# Patient Record
Sex: FEMALE | Race: WHITE | NOT HISPANIC OR LATINO | URBAN - METROPOLITAN AREA
[De-identification: names, ages, dates, MRNs, and addresses within clinical notes are randomized per-mention and may not be internally consistent; named-entity substitution may affect disease eponyms.]

---

## 2021-05-14 ENCOUNTER — IMMUNIZATION (OUTPATIENT)
Dept: OBSTETRICS AND GYNECOLOGY | Facility: CLINIC | Age: 46
End: 2021-05-14
Payer: COMMERCIAL

## 2021-05-14 DIAGNOSIS — Z23 NEED FOR VACCINATION: Primary | ICD-10-CM

## 2021-05-14 PROCEDURE — 91300 COVID-19, MRNA, LNP-S, PF, 30 MCG/0.3 ML DOSE VACCINE: CPT | Mod: PBBFAC | Performed by: FAMILY MEDICINE

## 2021-06-02 ENCOUNTER — IMMUNIZATION (OUTPATIENT)
Dept: OBSTETRICS AND GYNECOLOGY | Facility: CLINIC | Age: 46
End: 2021-06-02
Payer: COMMERCIAL

## 2021-06-02 DIAGNOSIS — Z23 NEED FOR VACCINATION: Primary | ICD-10-CM

## 2021-06-02 PROCEDURE — 0002A COVID-19, MRNA, LNP-S, PF, 30 MCG/0.3 ML DOSE VACCINE: CPT | Mod: PBBFAC | Performed by: NURSE PRACTITIONER

## 2021-06-02 PROCEDURE — 91300 COVID-19, MRNA, LNP-S, PF, 30 MCG/0.3 ML DOSE VACCINE: CPT | Mod: PBBFAC | Performed by: NURSE PRACTITIONER

## 2023-09-08 ENCOUNTER — HOSPITAL ENCOUNTER (EMERGENCY)
Facility: OTHER | Age: 48
Discharge: HOME | End: 2023-09-08
Attending: EMERGENCY MEDICINE
Payer: COMMERCIAL

## 2023-09-08 VITALS
HEART RATE: 78 BPM | HEIGHT: 68 IN | BODY MASS INDEX: 25.76 KG/M2 | SYSTOLIC BLOOD PRESSURE: 143 MMHG | TEMPERATURE: 98 F | WEIGHT: 170 LBS | OXYGEN SATURATION: 98 % | DIASTOLIC BLOOD PRESSURE: 66 MMHG | RESPIRATION RATE: 18 BRPM
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
## 2023-09-08 DIAGNOSIS — M25.562 ACUTE PAIN OF BOTH KNEES: ICD-10-CM

## 2023-09-08 DIAGNOSIS — M25.562 LEFT KNEE PAIN: ICD-10-CM

## 2023-09-08 DIAGNOSIS — M25.561 ACUTE PAIN OF BOTH KNEES: ICD-10-CM

## 2023-09-08 DIAGNOSIS — M25.561 RIGHT KNEE PAIN: ICD-10-CM

## 2023-09-08 PROCEDURE — 63600175 PHARM REV CODE 636 W HCPCS

## 2023-09-08 PROCEDURE — 96372 THER/PROPH/DIAG INJ SC/IM: CPT

## 2023-09-08 PROCEDURE — 99284 EMERGENCY DEPT VISIT MOD MDM: CPT

## 2023-09-08 RX ORDER — KETOROLAC TROMETHAMINE 30 MG/ML
10 INJECTION, SOLUTION INTRAMUSCULAR; INTRAVENOUS
Status: COMPLETED | OUTPATIENT
Start: 2023-09-08 | End: 2023-09-08

## 2023-09-08 RX ORDER — ACETAMINOPHEN 325 MG/1
650 TABLET ORAL
Status: DISCONTINUED | OUTPATIENT
Start: 2023-09-08 | End: 2023-09-08 | Stop reason: HOSPADM

## 2023-09-08 RX ORDER — NAPROXEN 500 MG/1
500 TABLET ORAL 2 TIMES DAILY PRN
Qty: 10 TABLET | Refills: 0 | Status: SHIPPED | OUTPATIENT
Start: 2023-09-08 | End: 2023-09-13

## 2023-09-08 RX ADMIN — KETOROLAC TROMETHAMINE 10 MG: 30 INJECTION, SOLUTION INTRAMUSCULAR; INTRAVENOUS at 11:09

## 2023-09-08 NOTE — ED TRIAGE NOTES
Tripped and fell this morning - presents with c/o pain to both knees, L>R. States able to bear weight but with pain. No other complaints.

## 2023-09-08 NOTE — DISCHARGE INSTRUCTIONS
Please try naproxen as needed for your pain.  If you have a reaction to naproxen, stop use and please take Benadryl and Pepcid.  Take Tylenol as needed for pain.  Please follow-up with your orthopedic surgeon.

## 2023-09-08 NOTE — FIRST PROVIDER EVALUATION
Emergency Department TeleTriage Encounter Note      CHIEF COMPLAINT    Chief Complaint   Patient presents with    Knee Pain     Reports tripping over puppy this morning landing on BL knees. Reports pain to both knees, worsening in L knee with trouble bending. Hx of knee problems. Swelling noted to BL knees.        VITAL SIGNS   Initial Vitals [09/08/23 0946]   BP Pulse Resp Temp SpO2   (!) 126/58 81 18 98.1 °F (36.7 °C) 98 %      MAP       --            ALLERGIES    Review of patient's allergies indicates:   Allergen Reactions    Ibuprofen Hives    Augmentin [amoxicillin-pot clavulanate] Rash    Codeine Rash       PROVIDER TRIAGE NOTE  Patient presents with pain in bilateral knees secondary to fall this morning. Denies other injuries.       ORDERS  Labs Reviewed - No data to display    ED Orders (720h ago, onward)      None              Virtual Visit Note: The provider triage portion of this emergency department evaluation and documentation was performed via [employer], a HIPAA-compliant telemedicine application, in concert with a tele-presenter in the room. A face to face patient evaluation with one of my colleagues will occur once the patient is placed in an emergency department room.      DISCLAIMER: This note was prepared with [employer] voice recognition transcription software. Garbled syntax, mangled pronouns, and other bizarre constructions may be attributed to that software system.

## 2023-09-08 NOTE — ED PROVIDER NOTES
Encounter Date: 9/8/2023       History     Chief Complaint   Patient presents with    Knee Pain     Reports tripping over puppy this morning landing on BL knees. Reports pain to both knees, worsening in L knee with trouble bending. Hx of knee problems. Swelling noted to BL knees.      Yeison Cornell is a 48 y.o. female with history of chronic bilateral knee pain presenting to the emergency department for evaluation of worsening bilateral knee pain after tripping over her dog and falling onto both knees in the kitchen earlier this morning.  Patient states that the ayanna in her kitchen is tile.  Patient states that she did hit her right elbow on the cabinet and head on the refrigerator.  She denies loss of consciousness.  She has been ambulatory with a limp since the incident.  No treatments prior to arrival.  She is not on blood thinners.  Patient states that she has been receiving joint injections into both knees by her orthopedic surgeon.  She is followed by Dr. Worthington at [facility].  She denies paresthesias.      The history is provided by the patient.     Review of patient's allergies indicates:   Allergen Reactions    Ibuprofen Hives    Augmentin [amoxicillin-pot clavulanate] Rash    Codeine Rash     No past medical history on file.  No past surgical history on file.  No family history on file.     Review of Systems   Constitutional:  Negative for chills and fever.   HENT:  Negative for congestion, rhinorrhea and sore throat.    Respiratory:  Negative for cough and shortness of breath.    Cardiovascular:  Negative for chest pain.   Gastrointestinal:  Negative for abdominal pain, diarrhea, nausea and vomiting.   Genitourinary:  Negative for dysuria, frequency and urgency.   Musculoskeletal:  Negative for back pain.        Positive for bilateral knee pain.   Skin:  Negative for rash.   Neurological:  Negative for dizziness, numbness and headaches.   Psychiatric/Behavioral:  Negative for confusion.         Physical Exam     Initial Vitals [09/08/23 0946]   BP Pulse Resp Temp SpO2   (!) 126/58 81 18 98.1 °F (36.7 °C) 98 %      MAP       --         Physical Exam    Nursing note and vitals reviewed.  Constitutional: She appears well-developed and well-nourished. No distress.   HENT:   Head: Normocephalic and atraumatic.   Eyes: Conjunctivae and EOM are normal.   Neck: Neck supple.   Normal range of motion.  Cardiovascular:  Normal rate, regular rhythm, normal heart sounds and intact distal pulses.           Pulses:       Dorsalis pedis pulses are 2+ on the right side and 2+ on the left side.        Posterior tibial pulses are 2+ on the right side and 2+ on the left side.   Pulmonary/Chest: Breath sounds normal. No respiratory distress. She has no wheezes. She has no rhonchi. She has no rales. She exhibits no tenderness.   Musculoskeletal:         General: Normal range of motion.      Cervical back: Normal range of motion and neck supple.      Comments: Normal range of motion of bilateral knees.  Tenderness to palpation of anterolateral knees.  Crepitus present on the right.  No obvious deformity.  No overlying skin changes.     Neurological: She is alert and oriented to person, place, and time. She has normal strength. No cranial nerve deficit or sensory deficit.   Skin: Skin is warm and dry.   No abrasions or lacerations.   Psychiatric: She has a normal mood and affect. Her behavior is normal. Judgment and thought content normal.         ED Course   Procedures  Labs Reviewed - No data to display       Imaging Results              X-Ray Knee 1 or 2 View Right (Final result)  Result time 09/08/23 11:57:06   Procedure changed from X-Ray Knee 3 View Right     Final result by Kala Silva MD (09/08/23 11:57:06)                   Impression:      No fracture identified.  Minimal soft tissue edema at the prepatellar region.      Electronically signed by: Kala Silva  MD  Date:    09/08/2023  Time:    11:57               Narrative:    EXAMINATION:  XR KNEE 1 OR 2 VIEW RIGHT    CLINICAL HISTORY:  pain;  Pain in right knee    TECHNIQUE:  AP and lateral view    COMPARISON:  None    FINDINGS:  The tibiotalar joint alignment is normal.  The joint space is preserved.  No significant osteophyte, sclerosis or geodes.  No fracture or osseous lesions.  The femoral patellar joint space appears normal.  No degenerative change at the femoral patellar joint.  No joint effusion.  Minimal soft tissue edema in the prepatellar region.                        Final result by Kala Silva MD (09/08/23 11:57:06)                   Impression:      No fracture identified.  Minimal soft tissue edema at the prepatellar region.      Electronically signed by: Kala Silva MD  Date:    09/08/2023  Time:    11:57               Narrative:    EXAMINATION:  XR KNEE 1 OR 2 VIEW RIGHT    CLINICAL HISTORY:  pain;  Pain in right knee    TECHNIQUE:  AP and lateral view    COMPARISON:  None    FINDINGS:  The tibiotalar joint alignment is normal.  The joint space is preserved.  No significant osteophyte, sclerosis or geodes.  No fracture or osseous lesions.  The femoral patellar joint space appears normal.  No degenerative change at the femoral patellar joint.  No joint effusion.  Minimal soft tissue edema in the prepatellar region.                                       X-Ray Knee 1 or 2 View Left (Final result)  Result time 09/08/23 11:52:04   Procedure changed from X-Ray Knee 3 View Left     Final result by Zac Hinds MD (09/08/23 11:52:04)                   Impression:      No convincing evidence acute fracture or dislocation.      Electronically signed by: Zac Hinds  Date:    09/08/2023  Time:    11:52               Narrative:    EXAMINATION:  XR KNEE 1 OR 2 VIEW LEFT    CLINICAL HISTORY:  pain; Pain in left knee    TECHNIQUE:  One or two views of the left knee were  performed.    COMPARISON:  None    FINDINGS:  No convincing evidence of acute fracture or dislocation.  Joint spaces appear grossly maintained.  No evidence of radiopaque foreign body.  No large knee joint effusion.                        Final result by Zac Hinds MD (09/08/23 11:52:04)                   Impression:      No convincing evidence acute fracture or dislocation.      Electronically signed by: Zac Hinds  Date:    09/08/2023  Time:    11:52               Narrative:    EXAMINATION:  XR KNEE 1 OR 2 VIEW LEFT    CLINICAL HISTORY:  pain; Pain in left knee    TECHNIQUE:  One or two views of the left knee were performed.    COMPARISON:  None    FINDINGS:  No convincing evidence of acute fracture or dislocation.  Joint spaces appear grossly maintained.  No evidence of radiopaque foreign body.  No large knee joint effusion.                        Final result by Zac Hinds MD (09/08/23 11:52:04)                   Impression:      No convincing evidence acute fracture or dislocation.      Electronically signed by: Zac Hinds  Date:    09/08/2023  Time:    11:52               Narrative:    EXAMINATION:  XR KNEE 1 OR 2 VIEW LEFT    CLINICAL HISTORY:  pain; Pain in left knee    TECHNIQUE:  One or two views of the left knee were performed.    COMPARISON:  None    FINDINGS:  No convincing evidence of acute fracture or dislocation.  Joint spaces appear grossly maintained.  No evidence of radiopaque foreign body.  No large knee joint effusion.                        Final result by Zac Hinds MD (09/08/23 11:52:04)                   Impression:      No convincing evidence acute fracture or dislocation.      Electronically signed by: Zac Hinds  Date:    09/08/2023  Time:    11:52               Narrative:    EXAMINATION:  XR KNEE 1 OR 2 VIEW LEFT    CLINICAL HISTORY:  pain; Pain in left knee    TECHNIQUE:  One or two views of the left knee were  performed.    COMPARISON:  None    FINDINGS:  No convincing evidence of acute fracture or dislocation.  Joint spaces appear grossly maintained.  No evidence of radiopaque foreign body.  No large knee joint effusion.                        Final result by Zac Hinds MD (09/08/23 11:52:04)                   Impression:      No convincing evidence acute fracture or dislocation.      Electronically signed by: Zac Hinds  Date:    09/08/2023  Time:    11:52               Narrative:    EXAMINATION:  XR KNEE 1 OR 2 VIEW LEFT    CLINICAL HISTORY:  pain; Pain in left knee    TECHNIQUE:  One or two views of the left knee were performed.    COMPARISON:  None    FINDINGS:  No convincing evidence of acute fracture or dislocation.  Joint spaces appear grossly maintained.  No evidence of radiopaque foreign body.  No large knee joint effusion.                                    X-Rays:   Independently Interpreted Readings:   Other Readings:  Right knee:  No fracture or dislocation.  Soft tissue edema present in the prepatellar region.    Left knee: No fracture or dislocation.    Medications   acetaminophen tablet 650 mg (650 mg Oral Not Given 9/8/23 1030)   ketorolac injection 9.999 mg (9.999 mg Intramuscular Given 9/8/23 1145)     Medical Decision Making  Risk  Prescription drug management.                          Medical Decision Making:   Initial Assessment:   Urgent evaluation of 48-year-old female who presents with bilateral knee pain status post trip and fall that occurred this morning.  She does report hitting her head on the refrigerator but denies loss of consciousness.  She has been ambulatory since the incident.  She is not on blood thinners.  No abrasions or lacerations.  She does have history of bilateral knee pain and currently receives joint injections by her orthopedic surgeon.  On exam, she was able to bear weight on both lower extremities.  Patient is ambulatory with a limp.  She does have tenderness  to palpation of the anterior lateral knee near the suprapatellar region.  Mild crepitus on the right.  No obvious deformity.  Plan for imaging.  Will give Toradol.  Clinical Tests:   Radiological Study: Ordered and Reviewed  ED Management:  On review of films, there are no fractures or dislocation of the right or left knee.  Soft tissue edema to right prepatellar region.  I updated the patient on all results.  We will discharge home with naproxen.  Patient also advised to alternate Tylenol, ice packs, and heating pads as needed for pain.  Advised to follow-up with her orthopedic surgeon if her symptoms persist or worsen.  Patient verbalized understanding and agreement with this plan of care. She was given specific return precautions. Advised to follow up with PCP as needed. All questions and concerns addressed. She is stable for discharge.       Clinical Impression:   Final diagnoses:  [M25.562] Left knee pain  [M25.561] Right knee pain  [M25.561, M25.562] Acute pain of both knees  [W19.XXXA] Fall, initial encounter (Primary)        ED Disposition Condition    Discharge Stable          ED Prescriptions       Medication Sig Dispense Start Date End Date Auth. Provider    naproxen (NAPROSYN) 500 MG tablet Take 1 tablet (500 mg total) by mouth 2 (two) times daily as needed (for pain). 10 tablet 9/8/2023 9/13/2023 Jude Nolen PA-C          Follow-up Information    None          Jude Nolen PA-C  09/08/23 2077